# Patient Record
Sex: MALE | Race: WHITE | NOT HISPANIC OR LATINO | Employment: OTHER | URBAN - NONMETROPOLITAN AREA
[De-identification: names, ages, dates, MRNs, and addresses within clinical notes are randomized per-mention and may not be internally consistent; named-entity substitution may affect disease eponyms.]

---

## 2023-01-18 RX ORDER — DULAGLUTIDE 1.5 MG/.5ML
INJECTION, SOLUTION SUBCUTANEOUS
COMMUNITY
Start: 2022-12-20

## 2023-01-18 RX ORDER — VARDENAFIL HYDROCHLORIDE 20 MG/1
TABLET ORAL
COMMUNITY
Start: 2023-01-05

## 2023-01-18 RX ORDER — INSULIN DEGLUDEC INJECTION 100 U/ML
INJECTION, SOLUTION SUBCUTANEOUS
COMMUNITY
Start: 2022-12-01

## 2023-01-18 RX ORDER — SIMVASTATIN 20 MG
TABLET ORAL
COMMUNITY
Start: 2023-01-04

## 2023-01-18 NOTE — PROGRESS NOTES
UROLOGY PROGRESS NOTE         NAME: Maribeth Galeano  AGE: 71 y o  SEX: male  : 1950   MRN: [id number]    DATE: 2023  TIME: 4:44 PM    Assessment and Plan   Procedures     Impression:   1  Erectile dysfunction, unspecified erectile dysfunction type    2  Adenoma of left adrenal gland         Plan: Plan is to try to improve his erectile dysfunction he has oral refractory ED  He is tried a vacuum pump in [place] years ago that did not work either  I told him I do not think the use works that well and recommended injection therapy  Organ to send a prescription for Edex 20 mcg I instructed the patient how to inject it and I encouraged him to watch a video on YouTube as well  If the Edex is too expensive or does not work, look for a local pharmacy through the [facility] to see if we can get it sent to his office pending the efficacy of Edex benefits tolerable  And its affordable as well  Patient agrees with this plan all questions were answered we will see him back in 1 year or sooner if his erection quality has improved we certainly can consider referral to the [address] [place] [address] urology for possible prosthesis  Patient is agreeable to this      Chief Complaint   No chief complaint on file  History of Present Illness     HPI: Maribeth Galeano is a 71y o  year old male who presents with for evaluation of erectile dysfunction  Currently patient doing well no irritable or obstructive voiding complaints  Patient states he had a PSA with his PCP we will try to get that faxed to us  Course way back in  by again an incision and drainage of a  Scrotal abscess and then he had a recurrence somewhere around   He is done pretty well but did have some drainage in September that he saw his PCP was placed on antibiotics and it cleared up pretty well            The following portions of the patient's history were reviewed and updated as appropriate: allergies, current medications, past family history, past medical history, past social history, past surgical history and problem list   Past Medical History:   Diagnosis Date   • HTN (hypertension)    • Hyperlipidemia    • Type 2 diabetes mellitus ([address] )      Past Surgical History:   Procedure Laterality Date   • CHOLECYSTECTOMY     • COLONOSCOPY  02/27/2019     shoulder  Review of Systems     Const: Denies chills, fever and weight loss  CV: Denies chest pain  Resp: Denies SOB  GI: Denies abdominal pain, nausea and vomiting  : Denies symptoms other than stated above  Musculo: Denies back pain  Objective   There were no vitals taken for this visit  Physical Exam  Const: Appears healthy and well developed  No signs of acute distress present  Resp: Respirations are regular and unlabored  CV: Rate is regular  Rhythm is regular  Abdomen: Abdomen is soft, nontender, and nondistended  Kidneys are not palpable  : He had a normal external genitalia exam there is no perineal discomfort no focal lesions palpable  I strongly recommended a rectal exam but he states that his PCP did 1 in September and it was normal   Psych: Patient's attitude is cooperative   Mood is normal  Affect is normal     Current Medications     Current Outpatient Medications:   •  simvastatin (ZOCOR) 20 mg tablet, , Disp: , Rfl:   •  Tresiba FlexTouch 100 units/mL injection pen, INJECT 40 UNITS DAILY AT NIGHT, Disp: , Rfl:   •  Trulicity 1 5 XI/3 2BL injection, INJECT DOSE ONCE WEEKLY INTO ABDOMEN/THIGH/UPPER ARM, Disp: , Rfl:   •  vardenafil (LEVITRA) 20 MG tablet, TAKE 1 TABLET BY MOUTH AS NEEDED PRIOR TO SEXUAL INTERCOURSE, Disp: , Rfl:         Sherry Yates MD

## 2023-01-19 ENCOUNTER — OFFICE VISIT (OUTPATIENT)
Dept: UROLOGY | Facility: CLINIC | Age: 73
End: 2023-01-19

## 2023-01-19 VITALS
HEART RATE: 103 BPM | HEIGHT: 68 IN | BODY MASS INDEX: 26.98 KG/M2 | WEIGHT: 178 LBS | TEMPERATURE: 98.2 F | OXYGEN SATURATION: 98 %

## 2023-01-19 DIAGNOSIS — D35.02 ADENOMA OF LEFT ADRENAL GLAND: ICD-10-CM

## 2023-01-19 DIAGNOSIS — N52.9 ERECTILE DYSFUNCTION, UNSPECIFIED ERECTILE DYSFUNCTION TYPE: ICD-10-CM

## 2023-01-19 DIAGNOSIS — N52.9 ERECTILE DYSFUNCTION, UNSPECIFIED ERECTILE DYSFUNCTION TYPE: Primary | ICD-10-CM

## 2023-01-19 RX ORDER — LISINOPRIL 10 MG/1
10 TABLET ORAL DAILY
COMMUNITY
Start: 2023-01-10

## 2023-01-19 NOTE — TELEPHONE ENCOUNTER
Call[facility] and [facility] his insurance does not cover Edex at all and there is no alternative medication  I called and left detailed message on patient machine to let him know this and if he would reach out to his insurance to see if there is any other medications covered, and provided office phone number to call us back to let us know and we can send something else in

## 2023-01-19 NOTE — TELEPHONE ENCOUNTER
Patient called back and states he is willing to pay out of pocket for this  Wanted to know if you can just send in for 1 and will check the price for just one   Please resend for just one thanks

## 2023-01-20 ENCOUNTER — TELEPHONE (OUTPATIENT)
Dept: UROLOGY | Facility: CLINIC | Age: 73
End: 2023-01-20

## 2023-01-20 NOTE — TELEPHONE ENCOUNTER
Please let patient know we are going to send in the Trimix that will be a lot cheaper form and if its not working for him to let us know    Thank you

## 2023-01-20 NOTE — TELEPHONE ENCOUNTER
Call to patient, he was made aware that medication Rx'd  by Dr Leona Mckoy was faxed to Pharmacy to order Tri-mix  Patient demographics along with insurance information was also sent  Patient verbalized understanding

## 2023-01-23 RX ORDER — ALPROSTADIL 20 UG/ML
INJECTION, POWDER, LYOPHILIZED, FOR SOLUTION INTRACAVERNOUS
Qty: 1 KIT | Refills: 5 | OUTPATIENT
Start: 2023-01-23

## 2023-02-16 ENCOUNTER — TELEPHONE (OUTPATIENT)
Dept: UROLOGY | Facility: CLINIC | Age: 73
End: 2023-02-16

## 2023-02-16 NOTE — TELEPHONE ENCOUNTER
See patient message encounter from 1/25  Pt states he understands directions nothing further needed

## 2024-08-08 DIAGNOSIS — N52.9 ERECTILE DYSFUNCTION, UNSPECIFIED ERECTILE DYSFUNCTION TYPE: ICD-10-CM

## 2024-08-08 DIAGNOSIS — R35.1 NOCTURIA: Primary | ICD-10-CM

## 2024-08-19 NOTE — TELEPHONE ENCOUNTER
Please verify if patient needs EDEX or TRIMIX.  Prior notes say his insurance would not cover EDEX and he resorted to using TRIMIX.

## 2024-08-19 NOTE — TELEPHONE ENCOUNTER
Patient returned call and states that he is using the TRIMIX 0.588 mg/5.88mcg/ml injectable.    Please review.    Pt states he used [facility] last time.    Call back [phone]

## 2024-08-21 NOTE — TELEPHONE ENCOUNTER
Looks like Dr Prince requests an office visit prior to refilling this medication.  This message was routed to me.  Do you need anything further from me?

## 2024-09-25 PROBLEM — N52.9 ERECTILE DYSFUNCTION: Status: ACTIVE | Noted: 2024-09-25

## 2024-09-25 PROBLEM — R35.1 NOCTURIA: Status: ACTIVE | Noted: 2024-09-25

## 2024-09-25 PROBLEM — D35.02 ADRENAL ADENOMA, LEFT: Status: ACTIVE | Noted: 2024-09-25

## 2024-09-25 NOTE — PROGRESS NOTES
UROLOGY PROGRESS NOTE         NAME: Herbert Martínez  AGE: 74 y.o. SEX: male  : 1950   MRN: [id number]    DATE: 2024  TIME: 12:29 PM    Assessment and Plan   Procedures     Impression:   1. Erectile dysfunction, unspecified erectile dysfunction type  2. Adrenal adenoma, left  3. Nocturia       Plan: Refilled his Edex 20 mcg.  Patient to discuss with PCP about refills in the future.  I told the patient to have his PCP reach out to me to help with questions regarding refill of his Edex.    Also recommended yearly NELLIE PSA with his PCP and happy to see him back in the future if there is problems or concerns.  He understands and agrees.      Chief Complaint   No chief complaint on file.    History of Present Illness     HPI: Herbert Martínez is a 74 y.o. year old male who presents with follow-up from 2023.  Patient with a history of erectile dysfunction, nocturia, benign left adrenal adenoma.    Patient has oral refractory ED, has tried the vacuum pump and he has been on Edex injection therapy 20 mcg.    Urology history, back in  he had an I&D of the scrotal abscess and then a recurrence in  that required also drainage.  Patient is to get a PSA prior to the appointment.  States has a PSA with his PCP of that was 1.5 in March.  Denies any irritable or obstructive voiding complaints.  Patient did not want exam today.        The following portions of the patient's history were reviewed and updated as appropriate: allergies, current medications, past family history, past medical history, past social history, past surgical history and problem list.  Past Medical History:   Diagnosis Date    HTN (hypertension)     Hyperlipidemia     Type 2 diabetes mellitus ([facility])      Past Surgical History:   Procedure Laterality Date    CHOLECYSTECTOMY      COLONOSCOPY  2019     shoulder  Review of Systems     Const: Denies chills, fever and weight loss.  CV: Denies chest pain.  Resp: Denies SOB.  GI: Denies  abdominal pain, nausea and vomiting.  : Denies symptoms other than stated above.  Musculo: Denies back pain.    Objective   There were no vitals taken for this visit.    Physical Exam  Const: Appears healthy and well developed. No signs of acute distress present.  Resp:   CV:   Abdomen:   : Patient refused a NELLIE as well as her abdominal and external genitalia exam  Psych: Patient's attitude is cooperative. Mood is normal. Affect is normal.    Current Medications     Current Outpatient Medications:     alprostadil (EDEX) 20 MCG injection, 20 mcg by Intracavitary route as needed for erectile dysfunction use no more than 3 times per week, Disp: 10 kit, Rfl: 5    lisinopril (ZESTRIL) 10 mg tablet, Take 10 mg by mouth daily, Disp: , Rfl:     metFORMIN (GLUCOPHAGE) 1000 MG tablet, Take 1,000 mg by mouth 2 (two) times a day, Disp: , Rfl:     simvastatin (ZOCOR) 20 mg tablet, , Disp: , Rfl:     Tresiba FlexTouch 100 units/mL injection pen, INJECT 40 UNITS DAILY AT NIGHT, Disp: , Rfl:     Trulicity 1.5 MG/0.5ML injection, INJECT DOSE ONCE WEEKLY INTO ABDOMEN/THIGH/UPPER ARM, Disp: , Rfl:     vardenafil (LEVITRA) 20 MG tablet, TAKE 1 TABLET BY MOUTH AS NEEDED PRIOR TO SEXUAL INTERCOURSE, Disp: , Rfl:         Saúl Prince MD

## 2024-09-26 ENCOUNTER — TELEPHONE (OUTPATIENT)
Dept: UROLOGY | Facility: CLINIC | Age: 74
End: 2024-09-26

## 2024-09-26 NOTE — TELEPHONE ENCOUNTER
Patient said he is not making a appt to have PSA lab test at this time. Patient still would like to keep appt.     Patient confirmed upcoming appt.

## 2024-10-03 ENCOUNTER — OFFICE VISIT (OUTPATIENT)
Dept: UROLOGY | Facility: CLINIC | Age: 74
End: 2024-10-03
Payer: COMMERCIAL

## 2024-10-03 VITALS
BODY MASS INDEX: 25.01 KG/M2 | HEART RATE: 90 BPM | SYSTOLIC BLOOD PRESSURE: 130 MMHG | OXYGEN SATURATION: 95 % | HEIGHT: 68 IN | DIASTOLIC BLOOD PRESSURE: 66 MMHG | TEMPERATURE: 97.2 F | WEIGHT: 165 LBS

## 2024-10-03 DIAGNOSIS — Z79.4 TYPE 2 DIABETES MELLITUS WITH HYPERGLYCEMIA, WITH LONG-TERM CURRENT USE OF INSULIN (HCC): ICD-10-CM

## 2024-10-03 DIAGNOSIS — D35.02 ADRENAL ADENOMA, LEFT: ICD-10-CM

## 2024-10-03 DIAGNOSIS — E11.65 TYPE 2 DIABETES MELLITUS WITH HYPERGLYCEMIA, WITH LONG-TERM CURRENT USE OF INSULIN (HCC): ICD-10-CM

## 2024-10-03 DIAGNOSIS — R35.1 NOCTURIA: ICD-10-CM

## 2024-10-03 DIAGNOSIS — N52.9 ERECTILE DYSFUNCTION, UNSPECIFIED ERECTILE DYSFUNCTION TYPE: Primary | ICD-10-CM

## 2024-10-03 PROCEDURE — 99213 OFFICE O/P EST LOW 20 MIN: CPT | Performed by: UROLOGY

## 2024-10-28 ENCOUNTER — TELEPHONE (OUTPATIENT)
Age: 74
End: 2024-10-28

## 2024-10-28 NOTE — TELEPHONE ENCOUNTER
Pt called stating he was left message to call office I could not locate any documentation regarding this - no recent labs/imaging unsure what call would be in reference to.